# Patient Record
Sex: FEMALE
[De-identification: names, ages, dates, MRNs, and addresses within clinical notes are randomized per-mention and may not be internally consistent; named-entity substitution may affect disease eponyms.]

---

## 2023-08-30 ENCOUNTER — DASHBOARD ENCOUNTERS (OUTPATIENT)
Age: 68
End: 2023-08-30

## 2023-08-30 ENCOUNTER — LAB OUTSIDE AN ENCOUNTER (OUTPATIENT)
Dept: URBAN - METROPOLITAN AREA CLINIC 17 | Facility: CLINIC | Age: 68
End: 2023-08-30

## 2023-08-30 ENCOUNTER — CLAIMS CREATED FROM THE CLAIM WINDOW (OUTPATIENT)
Dept: URBAN - METROPOLITAN AREA CLINIC 17 | Facility: CLINIC | Age: 68
End: 2023-08-30
Payer: COMMERCIAL

## 2023-08-30 VITALS
DIASTOLIC BLOOD PRESSURE: 79 MMHG | SYSTOLIC BLOOD PRESSURE: 147 MMHG | WEIGHT: 191 LBS | HEART RATE: 78 BPM | TEMPERATURE: 97.6 F | BODY MASS INDEX: 29.98 KG/M2 | HEIGHT: 67 IN

## 2023-08-30 DIAGNOSIS — K86.89 ACUTE PANCREATIC FLUID COLLECTION: ICD-10-CM

## 2023-08-30 DIAGNOSIS — K76.0 FATTY LIVER: ICD-10-CM

## 2023-08-30 DIAGNOSIS — K58.0 IRRITABLE BOWEL SYNDROME WITH DIARRHEA: ICD-10-CM

## 2023-08-30 DIAGNOSIS — R10.84 ABDOMINAL CRAMPING, GENERALIZED: ICD-10-CM

## 2023-08-30 DIAGNOSIS — K21.9 GASTROESOPHAGEAL REFLUX DISEASE, UNSPECIFIED WHETHER ESOPHAGITIS PRESENT: ICD-10-CM

## 2023-08-30 DIAGNOSIS — K21.9 ACID REFLUX: ICD-10-CM

## 2023-08-30 DIAGNOSIS — Z86.010 PERSONAL HISTORY OF COLONIC POLYPS: ICD-10-CM

## 2023-08-30 PROBLEM — 197321007: Status: ACTIVE | Noted: 2023-08-30

## 2023-08-30 PROBLEM — 235595009: Status: ACTIVE | Noted: 2023-08-30

## 2023-08-30 PROBLEM — 428283002: Status: ACTIVE | Noted: 2023-08-30

## 2023-08-30 PROBLEM — 197125005: Status: ACTIVE | Noted: 2023-08-30

## 2023-08-30 PROCEDURE — 99205 OFFICE O/P NEW HI 60 MIN: CPT

## 2023-08-30 PROCEDURE — 99205 OFFICE O/P NEW HI 60 MIN: CPT | Performed by: INTERNAL MEDICINE

## 2023-08-30 RX ORDER — LISINOPRIL 10 MG/1
TABLET ORAL
Qty: 90 TABLET | Status: ACTIVE | COMMUNITY

## 2023-08-30 RX ORDER — LEVOTHYROXINE SODIUM 100 UG/ML
1 ML INJECTION, SOLUTION INTRAVENOUS ONCE A DAY
Qty: 30 | Status: ACTIVE | COMMUNITY
Start: 2023-08-30

## 2023-08-30 RX ORDER — HYDROXYCHLOROQUINE SULFATE 200 MG/1
TAKE TWO TABLETS BY MOUTH ONE TIME DAILY TABLET, FILM COATED ORAL
Qty: 180 UNSPECIFIED | Refills: 0 | Status: ACTIVE | COMMUNITY

## 2023-08-30 RX ORDER — HYOSCYAMINE SULFATE 0.12 MG/1
1 TABLET UNDER THE TONGUE AND ALLOW TO DISSOLVE AS NEEDED TABLET SUBLINGUAL THREE TIMES A DAY
Qty: 90 TABLET | Refills: 1 | OUTPATIENT
Start: 2023-08-30

## 2023-08-30 RX ORDER — GLIMEPIRIDE 1 MG/1
TABLET ORAL
Qty: 30 TABLET | Status: ACTIVE | COMMUNITY

## 2023-08-30 RX ORDER — RIFAXIMIN 550 MG/1
1 TABLET TABLET ORAL TWICE A DAY
Qty: 28 TABLET | Refills: 0 | OUTPATIENT
Start: 2023-08-30 | End: 2023-09-13

## 2023-08-30 RX ORDER — POLYETHYLENE GLYCOL-3350 AND ELECTROLYTES 236; 6.74; 5.86; 2.97; 22.74 G/274.31G; G/274.31G; G/274.31G; G/274.31G; G/274.31G
4000 ML POWDER, FOR SOLUTION ORAL 1
Qty: 1 | Refills: 0 | OUTPATIENT
Start: 2023-08-30 | End: 2023-08-31

## 2023-08-30 RX ORDER — PANTOPRAZOLE SODIUM 40 MG/1
1 TABLET TABLET, DELAYED RELEASE ORAL ONCE A DAY
Qty: 30 | Refills: 3 | OUTPATIENT
Start: 2023-08-30

## 2023-08-30 RX ORDER — METHOTREXATE 2.5 MG/1
TABLET ORAL
Qty: 52 TABLET | Status: ACTIVE | COMMUNITY

## 2023-08-30 NOTE — HPI-TODAY'S VISIT:
New patient here for complaints of diarrhea and constipation cycling, IBS, and acid reflux. She is having more diarrhea than constipation. Diarrheal episodes include 4-6 BMs a day. Constipation episodes include straining but no decrease in frequency. Denies hematochezia and melena.  Dx with IBS 4-5 years ago. Has tried Dicyclomine with no relief. Took and finished Hyocyamine. States it helped. Has acid reflux symptoms for the past 2 months that include belching, heartburn, and gas.  Tried Omeprazole QD with no relief. States she took it for a week and stopped. Has been taking immodium every other day. Which is helping with pain and diarrhea. Pepto bismol helps sometimes.  Has occasional nausea. No vomiting.  Last colonoscopy was 14 years ago at [facility]. Patient states there was one polyp with 10 year follow up recommendation. Denies unintentional weight loss.  Dairy is a known dietary trigger. No other known triggers. US of abdomen that was ordered by PCP Dr. Jensen showed fatty liver and decreased echogenicity in the pancreas with reccomendation for CT of abdomen w/ contrast.

## 2023-09-08 ENCOUNTER — LAB OUTSIDE AN ENCOUNTER (OUTPATIENT)
Dept: URBAN - METROPOLITAN AREA CLINIC 105 | Facility: CLINIC | Age: 68
End: 2023-09-08

## 2023-09-12 LAB
A/G RATIO: 1.5
ABSOLUTE BASOPHILS: 64
ABSOLUTE EOSINOPHILS: 152
ABSOLUTE LYMPHOCYTES: 2112
ABSOLUTE MONOCYTES: 600
ABSOLUTE NEUTROPHILS: 5072
ALBUMIN: 4.6
ALKALINE PHOSPHATASE: 71
ALT (SGPT): 15
AST (SGOT): 17
BASOPHILS: 0.8
BILIRUBIN, TOTAL: 0.6
BUN/CREATININE RATIO: (no result)
BUN: 15
C-REACTIVE PROTEIN, QUANT: 3.7
CALCIUM: 9.7
CARBON DIOXIDE, TOTAL: 25
CHLORIDE: 104
CREATININE: 0.9
EGFR: 70
EOSINOPHILS: 1.9
GLOBULIN, TOTAL: 3.1
GLUCOSE: 97
HEMATOCRIT: 40.3
HEMOGLOBIN: 13.3
IMMUNOGLOBULIN A, QN, SERUM: 165
INTERPRETATION: (no result)
LYMPHOCYTES: 26.4
MCH: 30.3
MCHC: 33
MCV: 91.8
MONOCYTES: 7.5
MPV: 11.1
NEUTROPHILS: 63.4
PLATELET COUNT: 287
POTASSIUM: 4
PROTEIN, TOTAL: 7.7
RDW: 15.2
RED BLOOD CELL COUNT: 4.39
SODIUM: 140
T-TRANSGLUTAMINASE (TTG) IGA: <1
WHITE BLOOD CELL COUNT: 8

## 2023-09-13 LAB
ADENOVIRUS F 40/41: NOT DETECTED
CALPROTECTIN, STOOL - QDX: (no result)
CAMPYLOBACTER: NOT DETECTED
CLOSTRIDIUM DIFFICILE: NOT DETECTED
ENTAMOEBA HISTOLYTICA: NOT DETECTED
ENTEROAGGREGATIVE E.COLI: NOT DETECTED
ENTEROTOXIGENIC E.COLI: NOT DETECTED
ESCHERICHIA COLI O157: NOT DETECTED
FECAL FAT, QUALITATIVE: (no result)
GIARDIA LAMBLIA: NOT DETECTED
NOROVIRUS GI/GII: NOT DETECTED
OVA AND PARASITE - QDX: (no result)
PANCREATICELASTASE ELISA, STOOL: (no result)
ROTAVIRUS A: NOT DETECTED
SALMONELLA SPP.: NOT DETECTED
SHIGA-LIKE TOXIN PRODUCING E.COLI: NOT DETECTED
SHIGELLA SPP. / ENTEROINVASIVE E.COLI: NOT DETECTED
VIBRIO PARAHAEMOLYTICUS: NOT DETECTED
VIBRIO SPP.: NOT DETECTED
YERSINIA ENTEROCOLITICA: NOT DETECTED

## 2023-09-14 ENCOUNTER — TELEPHONE ENCOUNTER (OUTPATIENT)
Dept: URBAN - METROPOLITAN AREA CLINIC 105 | Facility: CLINIC | Age: 68
End: 2023-09-14

## 2023-10-25 ENCOUNTER — OFFICE VISIT (OUTPATIENT)
Dept: URBAN - METROPOLITAN AREA SURGERY CENTER 16 | Facility: SURGERY CENTER | Age: 68
End: 2023-10-25

## 2023-12-11 ENCOUNTER — TELEPHONE ENCOUNTER (OUTPATIENT)
Dept: URBAN - METROPOLITAN AREA CLINIC 105 | Facility: CLINIC | Age: 68
End: 2023-12-11

## 2023-12-15 ENCOUNTER — CLAIMS CREATED FROM THE CLAIM WINDOW (OUTPATIENT)
Dept: URBAN - METROPOLITAN AREA CLINIC 4 | Facility: CLINIC | Age: 68
End: 2023-12-15
Payer: COMMERCIAL

## 2023-12-15 ENCOUNTER — OFFICE VISIT (OUTPATIENT)
Dept: URBAN - METROPOLITAN AREA SURGERY CENTER 16 | Facility: SURGERY CENTER | Age: 68
End: 2023-12-15
Payer: COMMERCIAL

## 2023-12-15 DIAGNOSIS — D12.0 ADENOMA OF CECUM: ICD-10-CM

## 2023-12-15 DIAGNOSIS — K64.8 EXTERNAL HEMORRHOIDS: ICD-10-CM

## 2023-12-15 DIAGNOSIS — Z12.11 COLON CANCER SCREENING: ICD-10-CM

## 2023-12-15 DIAGNOSIS — K57.30 ACQUIRED DIVERTICULOSIS OF COLON: ICD-10-CM

## 2023-12-15 DIAGNOSIS — D12.0 BENIGN NEOPLASM OF CECUM: ICD-10-CM

## 2023-12-15 PROCEDURE — 45381 COLONOSCOPY SUBMUCOUS NJX: CPT | Performed by: INTERNAL MEDICINE

## 2023-12-15 PROCEDURE — 00811 ANES LWR INTST NDSC NOS: CPT | Performed by: ANESTHESIOLOGY

## 2023-12-15 PROCEDURE — 00811 ANES LWR INTST NDSC NOS: CPT | Performed by: ANESTHESIOLOGIST ASSISTANT

## 2023-12-15 PROCEDURE — G8907 PT DOC NO EVENTS ON DISCHARG: HCPCS | Performed by: INTERNAL MEDICINE

## 2023-12-15 PROCEDURE — 45380 COLONOSCOPY AND BIOPSY: CPT | Performed by: INTERNAL MEDICINE

## 2023-12-15 PROCEDURE — 88305 TISSUE EXAM BY PATHOLOGIST: CPT | Performed by: PATHOLOGY

## 2023-12-15 RX ORDER — LEVOTHYROXINE SODIUM 100 UG/ML
1 ML INJECTION, SOLUTION INTRAVENOUS ONCE A DAY
Qty: 30 | Status: ACTIVE | COMMUNITY
Start: 2023-08-30

## 2023-12-15 RX ORDER — HYOSCYAMINE SULFATE 0.12 MG/1
1 TABLET UNDER THE TONGUE AND ALLOW TO DISSOLVE AS NEEDED TABLET SUBLINGUAL THREE TIMES A DAY
Qty: 90 TABLET | Refills: 1 | Status: ACTIVE | COMMUNITY
Start: 2023-08-30

## 2023-12-15 RX ORDER — LISINOPRIL 10 MG/1
TABLET ORAL
Qty: 90 TABLET | Status: ACTIVE | COMMUNITY

## 2023-12-15 RX ORDER — HYDROXYCHLOROQUINE SULFATE 200 MG/1
TAKE TWO TABLETS BY MOUTH ONE TIME DAILY TABLET, FILM COATED ORAL
Qty: 180 UNSPECIFIED | Refills: 0 | Status: ACTIVE | COMMUNITY

## 2023-12-15 RX ORDER — GLIMEPIRIDE 1 MG/1
TABLET ORAL
Qty: 30 TABLET | Status: ACTIVE | COMMUNITY

## 2023-12-15 RX ORDER — PANTOPRAZOLE SODIUM 40 MG/1
1 TABLET TABLET, DELAYED RELEASE ORAL ONCE A DAY
Qty: 30 | Refills: 3 | Status: ACTIVE | COMMUNITY
Start: 2023-08-30

## 2023-12-15 RX ORDER — METHOTREXATE 2.5 MG/1
TABLET ORAL
Qty: 52 TABLET | Status: ACTIVE | COMMUNITY

## 2024-01-03 ENCOUNTER — LAB OUTSIDE AN ENCOUNTER (OUTPATIENT)
Dept: URBAN - METROPOLITAN AREA CLINIC 105 | Facility: CLINIC | Age: 69
End: 2024-01-03

## 2024-01-03 ENCOUNTER — TELEPHONE ENCOUNTER (OUTPATIENT)
Dept: URBAN - METROPOLITAN AREA CLINIC 105 | Facility: CLINIC | Age: 69
End: 2024-01-03

## 2024-01-25 ENCOUNTER — TELEPHONE ENCOUNTER (OUTPATIENT)
Dept: URBAN - METROPOLITAN AREA CLINIC 98 | Facility: CLINIC | Age: 69
End: 2024-01-25

## 2024-01-30 ENCOUNTER — TELEPHONE ENCOUNTER (OUTPATIENT)
Dept: URBAN - METROPOLITAN AREA CLINIC 98 | Facility: CLINIC | Age: 69
End: 2024-01-30

## 2024-02-07 ENCOUNTER — COLON (OUTPATIENT)
Dept: URBAN - METROPOLITAN AREA MEDICAL CENTER 28 | Facility: MEDICAL CENTER | Age: 69
End: 2024-02-07

## 2024-03-20 ENCOUNTER — COLON (OUTPATIENT)
Dept: URBAN - METROPOLITAN AREA MEDICAL CENTER 28 | Facility: MEDICAL CENTER | Age: 69
End: 2024-03-20

## 2025-01-02 ENCOUNTER — TELEPHONE ENCOUNTER (OUTPATIENT)
Dept: URBAN - METROPOLITAN AREA CLINIC 23 | Facility: CLINIC | Age: 70
End: 2025-01-02